# Patient Record
Sex: FEMALE | Race: WHITE | Employment: UNEMPLOYED | URBAN - NONMETROPOLITAN AREA
[De-identification: names, ages, dates, MRNs, and addresses within clinical notes are randomized per-mention and may not be internally consistent; named-entity substitution may affect disease eponyms.]

---

## 2024-01-17 ENCOUNTER — OFFICE VISIT (OUTPATIENT)
Age: 64
End: 2024-01-17

## 2024-01-17 VITALS
HEART RATE: 62 BPM | DIASTOLIC BLOOD PRESSURE: 88 MMHG | OXYGEN SATURATION: 96 % | SYSTOLIC BLOOD PRESSURE: 118 MMHG | TEMPERATURE: 97.6 F

## 2024-01-17 DIAGNOSIS — K21.9 GASTROESOPHAGEAL REFLUX DISEASE WITHOUT ESOPHAGITIS: ICD-10-CM

## 2024-01-17 DIAGNOSIS — I10 PRIMARY HYPERTENSION: Primary | ICD-10-CM

## 2024-01-17 RX ORDER — MELOXICAM 15 MG/1
15 TABLET ORAL DAILY
COMMUNITY

## 2024-01-17 RX ORDER — HYDROCHLOROTHIAZIDE 25 MG/1
25 TABLET ORAL EVERY MORNING
Qty: 90 TABLET | Refills: 1 | Status: SHIPPED | OUTPATIENT
Start: 2024-01-17

## 2024-01-17 RX ORDER — LOSARTAN POTASSIUM 50 MG/1
50 TABLET ORAL DAILY
Qty: 90 TABLET | Refills: 1 | Status: SHIPPED | OUTPATIENT
Start: 2024-01-17

## 2024-01-17 RX ORDER — PANTOPRAZOLE SODIUM 40 MG/1
40 TABLET, DELAYED RELEASE ORAL
Qty: 90 TABLET | Refills: 1 | Status: SHIPPED | OUTPATIENT
Start: 2024-01-17

## 2024-01-17 RX ORDER — LOSARTAN POTASSIUM 50 MG/1
50 TABLET ORAL DAILY
Qty: 90 TABLET | Refills: 1 | Status: SHIPPED | OUTPATIENT
Start: 2024-01-17 | End: 2024-01-17

## 2024-01-17 RX ORDER — LOSARTAN POTASSIUM 50 MG/1
50 TABLET ORAL DAILY
COMMUNITY
Start: 2023-05-22 | End: 2024-01-17

## 2024-01-17 RX ORDER — MAGNESIUM 30 MG
30 TABLET ORAL 2 TIMES DAILY
COMMUNITY

## 2024-01-17 RX ORDER — HYDROCHLOROTHIAZIDE 25 MG/1
25 TABLET ORAL DAILY
COMMUNITY
Start: 2023-05-22 | End: 2024-01-17

## 2024-01-17 RX ORDER — PANTOPRAZOLE SODIUM 40 MG/1
40 TABLET, DELAYED RELEASE ORAL
COMMUNITY
End: 2024-01-17

## 2024-01-17 ASSESSMENT — ENCOUNTER SYMPTOMS
SHORTNESS OF BREATH: 0
EYE DISCHARGE: 0
CHEST TIGHTNESS: 0
EYE REDNESS: 0
EYE ITCHING: 0

## 2024-01-17 NOTE — PATIENT INSTRUCTIONS
Complete fasting labs in the future  Follow up 3 mo   Take rx as prescribed  Monitor symptoms of acid reflux if symptoms persist while taking medication report at follow up , and avoid acidic foods

## 2024-01-17 NOTE — PROGRESS NOTES
Renita Cha (:  1960) is a 63 y.o. female,Established patient, here for evaluation of the following chief complaint(s):  Medication Refill        Subjective   SUBJECTIVE/OBJECTIVE:  Medication Refill  Associated symptoms include arthralgias (torn meniscus had surgery intermittent pains worse with cold). Pertinent negatives include no chest pain, congestion, fever, headaches, rash or weakness.   pt here for medication refill stable on medication x 10 years losartan 50m and hctz 25mg  daily, checks bp routinely and keeps log. Pt reports frequent gardening and walking when weather permits, but no routine exercise at this time. She is not following a particular diet. She endorses intermittent acid reflux , triggered by foods such as tomatoes. She drinks daily fresh lemon drink, but is generally able to tolerate this without problems    Review of Systems   Constitutional:  Negative for fever.   HENT:  Negative for congestion.    Eyes:  Negative for discharge, redness and itching.   Respiratory:  Negative for chest tightness and shortness of breath.    Cardiovascular:  Negative for chest pain and palpitations.   Gastrointestinal:         Gerd   Genitourinary:  Negative for frequency and hematuria.   Musculoskeletal:  Positive for arthralgias (torn meniscus had surgery intermittent pains worse with cold).   Skin:  Negative for rash.   Neurological:  Negative for dizziness, seizures, weakness and headaches.   Psychiatric/Behavioral:  Negative for confusion.           Objective   Physical Exam  Constitutional:       Appearance: Normal appearance.   HENT:      Head: Normocephalic and atraumatic.      Nose: Nose normal.      Mouth/Throat:      Mouth: Mucous membranes are moist.      Pharynx: Oropharynx is clear.   Eyes:      Pupils: Pupils are equal, round, and reactive to light.   Cardiovascular:      Rate and Rhythm: Normal rate and regular rhythm.      Pulses: Normal pulses.      Heart sounds: Normal heart

## 2024-04-03 ENCOUNTER — OFFICE VISIT (OUTPATIENT)
Age: 64
End: 2024-04-03

## 2024-04-03 VITALS
HEIGHT: 62 IN | WEIGHT: 169 LBS | DIASTOLIC BLOOD PRESSURE: 72 MMHG | BODY MASS INDEX: 31.1 KG/M2 | TEMPERATURE: 99.4 F | OXYGEN SATURATION: 97 % | SYSTOLIC BLOOD PRESSURE: 116 MMHG | RESPIRATION RATE: 16 BRPM | HEART RATE: 72 BPM

## 2024-04-03 DIAGNOSIS — I10 PRIMARY HYPERTENSION: Primary | ICD-10-CM

## 2024-04-03 DIAGNOSIS — K21.9 GASTROESOPHAGEAL REFLUX DISEASE WITHOUT ESOPHAGITIS: ICD-10-CM

## 2024-04-03 LAB
ALBUMIN SERPL BCG-MCNC: 4.2 G/DL (ref 3.5–5.1)
ALP SERPL-CCNC: 88 U/L (ref 38–126)
ALT SERPL W/O P-5'-P-CCNC: 18 U/L (ref 11–66)
ANION GAP SERPL CALC-SCNC: 13 MEQ/L (ref 8–16)
AST SERPL-CCNC: 18 U/L (ref 5–40)
BASOPHILS ABSOLUTE: 0.1 THOU/MM3 (ref 0–0.1)
BASOPHILS NFR BLD AUTO: 1.1 %
BILIRUB SERPL-MCNC: 0.3 MG/DL (ref 0.3–1.2)
BUN SERPL-MCNC: 10 MG/DL (ref 7–22)
CALCIUM SERPL-MCNC: 9.2 MG/DL (ref 8.5–10.5)
CHLORIDE SERPL-SCNC: 103 MEQ/L (ref 98–111)
CHOLEST SERPL-MCNC: 220 MG/DL (ref 100–199)
CO2 SERPL-SCNC: 24 MEQ/L (ref 23–33)
CREAT SERPL-MCNC: 0.8 MG/DL (ref 0.4–1.2)
DEPRECATED RDW RBC AUTO: 44.6 FL (ref 35–45)
EOSINOPHIL NFR BLD AUTO: 3.3 %
EOSINOPHILS ABSOLUTE: 0.2 THOU/MM3 (ref 0–0.4)
ERYTHROCYTE [DISTWIDTH] IN BLOOD BY AUTOMATED COUNT: 13.5 % (ref 11.5–14.5)
GFR SERPL CREATININE-BSD FRML MDRD: 81 ML/MIN/1.73M2
GLUCOSE SERPL-MCNC: 83 MG/DL (ref 70–108)
HCT VFR BLD AUTO: 40.8 % (ref 37–47)
HDLC SERPL-MCNC: 57 MG/DL
HGB BLD-MCNC: 13.6 GM/DL (ref 12–16)
IMM GRANULOCYTES # BLD AUTO: 0.01 THOU/MM3 (ref 0–0.07)
IMM GRANULOCYTES NFR BLD AUTO: 0.2 %
LDLC SERPL CALC-MCNC: 143 MG/DL
LYMPHOCYTES ABSOLUTE: 1.8 THOU/MM3 (ref 1–4.8)
LYMPHOCYTES NFR BLD AUTO: 29.4 %
MCH RBC QN AUTO: 29.8 PG (ref 26–33)
MCHC RBC AUTO-ENTMCNC: 33.3 GM/DL (ref 32.2–35.5)
MCV RBC AUTO: 89.5 FL (ref 81–99)
MONOCYTES ABSOLUTE: 0.4 THOU/MM3 (ref 0.4–1.3)
MONOCYTES NFR BLD AUTO: 6.1 %
NEUTROPHILS NFR BLD AUTO: 59.9 %
NRBC BLD AUTO-RTO: 0 /100 WBC
PLATELET # BLD AUTO: 305 THOU/MM3 (ref 130–400)
PMV BLD AUTO: 10.2 FL (ref 9.4–12.4)
POTASSIUM SERPL-SCNC: 3.8 MEQ/L (ref 3.5–5.2)
PROT SERPL-MCNC: 7 G/DL (ref 6.1–8)
RBC # BLD AUTO: 4.56 MILL/MM3 (ref 4.2–5.4)
SEGMENTED NEUTROPHILS ABSOLUTE COUNT: 3.7 THOU/MM3 (ref 1.8–7.7)
SODIUM SERPL-SCNC: 140 MEQ/L (ref 135–145)
TRIGL SERPL-MCNC: 99 MG/DL (ref 0–199)
TSH SERPL DL<=0.005 MIU/L-ACNC: 1.97 UIU/ML (ref 0.4–4.2)
WBC # BLD AUTO: 6.1 THOU/MM3 (ref 4.8–10.8)

## 2024-04-03 RX ORDER — HYDROCHLOROTHIAZIDE 25 MG/1
25 TABLET ORAL EVERY MORNING
Qty: 90 TABLET | Refills: 0 | Status: SHIPPED | OUTPATIENT
Start: 2024-04-03

## 2024-04-03 RX ORDER — LOSARTAN POTASSIUM 50 MG/1
50 TABLET ORAL DAILY
Qty: 90 TABLET | Refills: 0 | Status: SHIPPED | OUTPATIENT
Start: 2024-04-03

## 2024-04-03 NOTE — PROGRESS NOTES
Pt requestsing refill, Hctz 25 ,Losartan 50mg under care of dr. Walters pcp for chronic conditions. Labs completed   Protonix 40 pt states was not helping her when she was taking it daily so she started taking it prn for symptoms when they came and did notice improvement.  Pt is no longer taking meloxicam.     Pt has never had colonoscopy, but [facility] completed and was negative  Family hx mother breast cancer  Last mammogram January 2023    Review of Systems   Constitutional:  Negative for fever.   HENT:  Negative for congestion.    Eyes:  Negative for discharge, redness and itching.   Respiratory:  Negative for chest tightness and shortness of breath.    Cardiovascular:  Negative for chest pain and palpitations.   Gastrointestinal:  Positive for abdominal pain (epigastric pains that go up to throat). Negative for diarrhea, nausea, rectal pain and vomiting.   Genitourinary:  Negative for frequency and hematuria.   Musculoskeletal:  Negative for arthralgias.   Skin:  Negative for rash.   Neurological:  Negative for dizziness, seizures, weakness and headaches.   Psychiatric/Behavioral:  Negative for confusion.       Physical Exam  Constitutional:       Appearance: Normal appearance. She is normal weight.   HENT:      Head: Normocephalic and atraumatic.      Nose: Nose normal.   Eyes:      Pupils: Pupils are equal, round, and reactive to light.   Pulmonary:      Effort: Pulmonary effort is normal.   Musculoskeletal:         General: Normal range of motion.   Skin:     General: Skin is warm.   Neurological:      Mental Status: She is alert and oriented to person, place, and time.   Psychiatric:         Mood and Affect: Mood normal.        Visit Diagnoses         Codes    Primary hypertension    -  Primary I10    Gastroesophageal reflux disease without esophagitis     K21.9           Orders Placed This Encounter   Procedures    TSH With Reflex Ft4     Standing Status:   Future     Number of Occurrences:   1     Standing

## 2024-04-04 LAB — 25(OH)D3 SERPL-MCNC: 39 NG/ML (ref 30–100)

## 2024-04-04 ASSESSMENT — ENCOUNTER SYMPTOMS
ABDOMINAL PAIN: 1
DIARRHEA: 0
EYE DISCHARGE: 0
CHEST TIGHTNESS: 0
EYE ITCHING: 0
RECTAL PAIN: 0
NAUSEA: 0
SHORTNESS OF BREATH: 0

## 2024-07-10 ENCOUNTER — OFFICE VISIT (OUTPATIENT)
Age: 64
End: 2024-07-10

## 2024-07-10 VITALS — DIASTOLIC BLOOD PRESSURE: 80 MMHG | SYSTOLIC BLOOD PRESSURE: 128 MMHG

## 2024-07-10 DIAGNOSIS — Z76.0 MEDICATION REFILL: Primary | ICD-10-CM

## 2024-07-10 DIAGNOSIS — I10 PRIMARY HYPERTENSION: ICD-10-CM

## 2024-07-10 DIAGNOSIS — Z00.00 ENCOUNTER FOR WELLNESS EXAMINATION: Primary | ICD-10-CM

## 2024-07-10 DIAGNOSIS — Z00.8 ENCOUNTER FOR BIOMETRIC SCREENING: ICD-10-CM

## 2024-07-10 LAB
CHOLESTEROL/HDL RATIO: NORMAL
CHP ED QC CHECK: NORMAL
GLUCOSE BLD-MCNC: 101 MG/DL
HBA1C MFR BLD: 5.8 %
HDLC SERPL-MCNC: 57 MG/DL (ref 35–70)
LDL CHOLESTEROL: 150
NICOTINE: NEGATIVE
SUM TOTAL CHOLESTEROL: 227
TRIGL SERPL-MCNC: 100 MG/DL
VLDLC SERPL CALC-MCNC: NORMAL MG/DL

## 2024-07-10 RX ORDER — COVID-19 ANTIGEN TEST
KIT MISCELLANEOUS
COMMUNITY

## 2024-07-10 RX ORDER — LOSARTAN POTASSIUM 50 MG/1
50 TABLET ORAL DAILY
Qty: 90 TABLET | Refills: 0 | Status: SHIPPED | OUTPATIENT
Start: 2024-07-10 | End: 2024-10-08

## 2024-07-10 RX ORDER — HYDROCHLOROTHIAZIDE 25 MG/1
25 TABLET ORAL EVERY MORNING
Qty: 90 TABLET | Refills: 0 | Status: SHIPPED | OUTPATIENT
Start: 2024-07-10 | End: 2024-10-08

## 2024-07-10 RX ORDER — OMEPRAZOLE 40 MG/1
40 CAPSULE, DELAYED RELEASE ORAL DAILY
COMMUNITY

## 2024-10-14 ENCOUNTER — TELEPHONE (OUTPATIENT)
Age: 64
End: 2024-10-14

## 2024-10-14 DIAGNOSIS — I10 PRIMARY HYPERTENSION: Primary | ICD-10-CM

## 2024-10-14 RX ORDER — LOSARTAN POTASSIUM 50 MG/1
50 TABLET ORAL DAILY
Qty: 90 TABLET | Refills: 0 | Status: SHIPPED | OUTPATIENT
Start: 2024-10-14

## 2024-10-14 RX ORDER — HYDROCHLOROTHIAZIDE 25 MG/1
25 TABLET ORAL EVERY MORNING
Qty: 90 TABLET | Refills: 0 | Status: SHIPPED | OUTPATIENT
Start: 2024-10-14

## 2024-10-14 RX ORDER — PANTOPRAZOLE SODIUM 40 MG/1
40 TABLET, DELAYED RELEASE ORAL
Qty: 90 TABLET | Refills: 0 | Status: SHIPPED | OUTPATIENT
Start: 2024-10-14

## 2024-10-14 NOTE — TELEPHONE ENCOUNTER
Patient is requesting Hydrochlorothiazide 25 mg daily, Losartan 50 mg daily, and Protonix 40 mg daily.    Thanks!    Alisa

## 2025-01-13 DIAGNOSIS — Z76.0 MEDICATION REFILL: Primary | ICD-10-CM

## 2025-01-13 DIAGNOSIS — K21.9 GASTROESOPHAGEAL REFLUX DISEASE WITHOUT ESOPHAGITIS: ICD-10-CM

## 2025-01-13 DIAGNOSIS — I10 PRIMARY HYPERTENSION: ICD-10-CM

## 2025-01-13 RX ORDER — PANTOPRAZOLE SODIUM 40 MG/1
40 TABLET, DELAYED RELEASE ORAL
Qty: 90 TABLET | Refills: 1 | Status: SHIPPED | OUTPATIENT
Start: 2025-01-13

## 2025-01-13 RX ORDER — HYDROCHLOROTHIAZIDE 25 MG/1
25 TABLET ORAL EVERY MORNING
Qty: 90 TABLET | Refills: 0 | Status: SHIPPED | OUTPATIENT
Start: 2025-01-13

## 2025-01-13 RX ORDER — LOSARTAN POTASSIUM 50 MG/1
50 TABLET ORAL DAILY
Qty: 90 TABLET | Refills: 0 | Status: SHIPPED | OUTPATIENT
Start: 2025-01-13

## 2025-02-21 ENCOUNTER — OFFICE VISIT (OUTPATIENT)
Age: 65
End: 2025-02-21

## 2025-02-21 VITALS
SYSTOLIC BLOOD PRESSURE: 117 MMHG | HEIGHT: 62 IN | RESPIRATION RATE: 14 BRPM | HEART RATE: 55 BPM | OXYGEN SATURATION: 98 % | WEIGHT: 174 LBS | DIASTOLIC BLOOD PRESSURE: 70 MMHG | TEMPERATURE: 98.5 F | BODY MASS INDEX: 32.02 KG/M2

## 2025-02-21 DIAGNOSIS — I10 PRIMARY HYPERTENSION: ICD-10-CM

## 2025-02-21 DIAGNOSIS — L20.9 ATOPIC DERMATITIS, UNSPECIFIED TYPE: Primary | ICD-10-CM

## 2025-02-21 DIAGNOSIS — K21.9 GASTROESOPHAGEAL REFLUX DISEASE WITHOUT ESOPHAGITIS: ICD-10-CM

## 2025-02-21 PROBLEM — K21.00 GASTROESOPHAGEAL REFLUX DISEASE WITH ESOPHAGITIS WITHOUT HEMORRHAGE: Status: ACTIVE | Noted: 2025-02-21

## 2025-02-21 PROBLEM — D22.9 NUMEROUS SKIN MOLES: Status: ACTIVE | Noted: 2022-12-16

## 2025-02-21 PROBLEM — K44.9 HIATAL HERNIA WITH GASTROESOPHAGEAL REFLUX DISEASE WITHOUT ESOPHAGITIS: Status: ACTIVE | Noted: 2023-05-22

## 2025-02-21 PROBLEM — K22.10 ULCER OF ESOPHAGUS WITHOUT BLEEDING: Status: ACTIVE | Noted: 2025-02-21

## 2025-02-21 PROBLEM — M17.12 ARTHRITIS OF LEFT KNEE: Status: ACTIVE | Noted: 2025-02-21

## 2025-02-21 RX ORDER — MELOXICAM 15 MG/1
15 TABLET ORAL DAILY
Qty: 90 TABLET | Refills: 0 | Status: SHIPPED | OUTPATIENT
Start: 2025-02-21 | End: 2025-02-21

## 2025-02-21 RX ORDER — HYDROCHLOROTHIAZIDE 25 MG/1
25 TABLET ORAL EVERY MORNING
Qty: 90 TABLET | Refills: 0 | Status: SHIPPED | OUTPATIENT
Start: 2025-02-21

## 2025-02-21 RX ORDER — LOSARTAN POTASSIUM 50 MG/1
50 TABLET ORAL DAILY
Qty: 90 TABLET | Refills: 0 | Status: SHIPPED | OUTPATIENT
Start: 2025-02-21

## 2025-02-21 RX ORDER — CLOBETASOL PROPIONATE 0.5 MG/G
OINTMENT TOPICAL
Qty: 60 G | Refills: 2 | Status: SHIPPED | OUTPATIENT
Start: 2025-02-21

## 2025-02-21 RX ORDER — PANTOPRAZOLE SODIUM 40 MG/1
40 TABLET, DELAYED RELEASE ORAL
Qty: 90 TABLET | Refills: 1 | Status: SHIPPED | OUTPATIENT
Start: 2025-02-21

## 2025-02-21 ASSESSMENT — ENCOUNTER SYMPTOMS
SHORTNESS OF BREATH: 0
NAIL CHANGES: 0
SORE THROAT: 0
COUGH: 0
DIARRHEA: 0
EYE PAIN: 0
VOMITING: 0
RHINORRHEA: 0

## 2025-02-21 NOTE — PROGRESS NOTES
Renita Cha (:  1960) is a 65 y.o. female,Established patient, here for evaluation of the following chief complaint(s):  Rash (Right lower arm)      Assessment & Plan :  Visit Diagnoses and Associated Orders       Atopic dermatitis, unspecified type    -  Primary    clobetasol (TEMOVATE) 0.05 % ointment [9631]           Gastroesophageal reflux disease without esophagitis        pantoprazole (PROTONIX) 40 MG tablet [57329]           Primary hypertension        losartan (COZAAR) 50 MG tablet [50171]      hydroCHLOROthiazide (HYDRODIURIL) 25 MG tablet [3720]                 65-year-old female presents to clinic today with a rash on right forearm.  Patient states the rash started about 4 5 days ago.  Patient denies changing any soap, laundry detergent, or lotions.  Evaluation erythema, vascular, pruritic, papular, urticarial.  Discussed using Benadryl over-the-counter, and cream sent to pharmacy.  Patient verbalizes understanding and is in agreement with treatment plan.       Subjective :    Rash  This is a new problem. The current episode started in the past 7 days. The problem has been waxing and waning since onset. The affected locations include the right arm. The rash is characterized by redness, scaling and itchiness. She was exposed to nothing. Pertinent negatives include no anorexia, congestion, cough, diarrhea, eye pain, facial edema, fatigue, fever, joint pain, nail changes, rhinorrhea, shortness of breath, sore throat or vomiting. Past treatments include analgesics and moisturizer. The treatment provided no relief. There is no history of allergies, asthma, eczema or varicella.     ROS  As documented in HPI.  A thorough 12 point review of systems was evaluated including Constitutional and general appearance, Head, Face, Ears, Eyes, nose, Throat, Cardiovascular,pulmonary, GI, , Skin, and Psychiatric and was found to be negative without symptoms or signs consistent with acute pathology with the

## 2025-02-21 NOTE — PATIENT INSTRUCTIONS
Take medication as prescribed  Tylenol or ibuprofen for pain or discomfort  Follow up as needed   If symptoms worsen return to clinic or go to ER  [facility]